# Patient Record
Sex: MALE | Race: BLACK OR AFRICAN AMERICAN | NOT HISPANIC OR LATINO | Employment: OTHER | URBAN - METROPOLITAN AREA
[De-identification: names, ages, dates, MRNs, and addresses within clinical notes are randomized per-mention and may not be internally consistent; named-entity substitution may affect disease eponyms.]

---

## 2017-01-25 ENCOUNTER — HOSPITAL ENCOUNTER (OUTPATIENT)
Dept: RADIOLOGY | Facility: OTHER | Age: 67
Discharge: HOME | End: 2017-01-25
Attending: OTOLARYNGOLOGY
Payer: MEDICARE

## 2017-01-25 DIAGNOSIS — J32.4 CHRONIC PANSINUSITIS: ICD-10-CM

## 2017-01-25 PROCEDURE — 70486 CT MAXILLOFACIAL W/O DYE: CPT | Mod: TC

## 2017-01-25 PROCEDURE — 70486 CT MAXILLOFACIAL W/O DYE: CPT | Mod: 26,,, | Performed by: RADIOLOGY

## 2017-05-06 ENCOUNTER — HOSPITAL ENCOUNTER (INPATIENT)
Facility: HOSPITAL | Age: 67
LOS: 1 days | Discharge: LEFT BEFORE DISCHARGE | DRG: 435 | End: 2017-05-07
Attending: EMERGENCY MEDICINE | Admitting: HOSPITALIST
Payer: MEDICARE

## 2017-05-06 DIAGNOSIS — K85.90 PANCREATITIS, ACUTE: ICD-10-CM

## 2017-05-06 DIAGNOSIS — R63.4 ABNORMAL WEIGHT LOSS: ICD-10-CM

## 2017-05-06 DIAGNOSIS — R10.84 GENERALIZED ABDOMINAL PAIN: Primary | ICD-10-CM

## 2017-05-06 DIAGNOSIS — M54.6 ACUTE BILATERAL THORACIC BACK PAIN: ICD-10-CM

## 2017-05-06 PROBLEM — R41.0 CONFUSION AND DISORIENTATION: Status: ACTIVE | Noted: 2017-05-06

## 2017-05-06 PROBLEM — E44.0 MODERATE PROTEIN-CALORIE MALNUTRITION: Status: ACTIVE | Noted: 2017-05-06

## 2017-05-06 PROBLEM — I81 PORTAL VEIN THROMBOSIS: Status: ACTIVE | Noted: 2017-05-06

## 2017-05-06 PROBLEM — R63.0 LACK OF APPETITE: Status: ACTIVE | Noted: 2017-05-06

## 2017-05-06 PROBLEM — R19.5 OCCULT GI BLEEDING: Status: ACTIVE | Noted: 2017-05-06

## 2017-05-06 PROBLEM — G89.29 CHRONIC LOW BACK PAIN: Status: ACTIVE | Noted: 2017-05-06

## 2017-05-06 PROBLEM — R10.30 LOWER ABDOMINAL PAIN: Status: ACTIVE | Noted: 2017-05-06

## 2017-05-06 PROBLEM — Z72.0 TOBACCO ABUSE: Status: ACTIVE | Noted: 2017-05-06

## 2017-05-06 PROBLEM — E83.52 HYPERCALCEMIA: Status: ACTIVE | Noted: 2017-05-06

## 2017-05-06 PROBLEM — I10 ESSENTIAL HYPERTENSION: Status: ACTIVE | Noted: 2017-05-06

## 2017-05-06 PROBLEM — M54.50 CHRONIC LOW BACK PAIN: Status: ACTIVE | Noted: 2017-05-06

## 2017-05-06 PROBLEM — K76.9 LIVER LESION, RIGHT LOBE: Status: ACTIVE | Noted: 2017-05-06

## 2017-05-06 PROBLEM — R91.8 MULTIPLE LUNG NODULES ON CT: Status: ACTIVE | Noted: 2017-05-06

## 2017-05-06 PROBLEM — D64.9 ANEMIA: Status: ACTIVE | Noted: 2017-05-06

## 2017-05-06 LAB
ALBUMIN SERPL BCP-MCNC: 2.9 G/DL
ALP SERPL-CCNC: 449 U/L
ALT SERPL W/O P-5'-P-CCNC: 59 U/L
ANION GAP SERPL CALC-SCNC: 9 MMOL/L
AST SERPL-CCNC: 333 U/L
BASOPHILS # BLD AUTO: 0.02 K/UL
BASOPHILS NFR BLD: 0.3 %
BILIRUB SERPL-MCNC: 1.3 MG/DL
BUN SERPL-MCNC: 37 MG/DL
CALCIUM SERPL-MCNC: 11.6 MG/DL
CHLORIDE SERPL-SCNC: 111 MMOL/L
CO2 SERPL-SCNC: 20 MMOL/L
CREAT SERPL-MCNC: 1.1 MG/DL
DIFFERENTIAL METHOD: ABNORMAL
EOSINOPHIL # BLD AUTO: 0.2 K/UL
EOSINOPHIL NFR BLD: 2.8 %
ERYTHROCYTE [DISTWIDTH] IN BLOOD BY AUTOMATED COUNT: 15.8 %
EST. GFR  (AFRICAN AMERICAN): >60 ML/MIN/1.73 M^2
EST. GFR  (NON AFRICAN AMERICAN): >60 ML/MIN/1.73 M^2
GLUCOSE SERPL-MCNC: 163 MG/DL
HCT VFR BLD AUTO: 27.3 %
HGB BLD-MCNC: 9 G/DL
LIPASE SERPL-CCNC: 435 U/L
LYMPHOCYTES # BLD AUTO: 2.4 K/UL
LYMPHOCYTES NFR BLD: 30.2 %
MCH RBC QN AUTO: 27 PG
MCHC RBC AUTO-ENTMCNC: 33 %
MCV RBC AUTO: 82 FL
MONOCYTES # BLD AUTO: 1.3 K/UL
MONOCYTES NFR BLD: 16.4 %
NEUTROPHILS # BLD AUTO: 4 K/UL
NEUTROPHILS NFR BLD: 50.2 %
PLATELET # BLD AUTO: 308 K/UL
PMV BLD AUTO: 10.3 FL
POTASSIUM SERPL-SCNC: 3.6 MMOL/L
PROT SERPL-MCNC: 8.1 G/DL
RBC # BLD AUTO: 3.33 M/UL
SODIUM SERPL-SCNC: 140 MMOL/L
WBC # BLD AUTO: 7.97 K/UL

## 2017-05-06 PROCEDURE — 83690 ASSAY OF LIPASE: CPT

## 2017-05-06 PROCEDURE — 25500020 PHARM REV CODE 255: Performed by: HOSPITALIST

## 2017-05-06 PROCEDURE — 80053 COMPREHEN METABOLIC PANEL: CPT

## 2017-05-06 PROCEDURE — 99284 EMERGENCY DEPT VISIT MOD MDM: CPT

## 2017-05-06 PROCEDURE — 25000003 PHARM REV CODE 250: Performed by: EMERGENCY MEDICINE

## 2017-05-06 PROCEDURE — 63600175 PHARM REV CODE 636 W HCPCS: Performed by: HOSPITALIST

## 2017-05-06 PROCEDURE — 99223 1ST HOSP IP/OBS HIGH 75: CPT | Mod: ,,, | Performed by: HOSPITALIST

## 2017-05-06 PROCEDURE — 63600175 PHARM REV CODE 636 W HCPCS: Performed by: EMERGENCY MEDICINE

## 2017-05-06 PROCEDURE — 96376 TX/PRO/DX INJ SAME DRUG ADON: CPT

## 2017-05-06 PROCEDURE — 96361 HYDRATE IV INFUSION ADD-ON: CPT

## 2017-05-06 PROCEDURE — 93010 ELECTROCARDIOGRAM REPORT: CPT | Mod: ,,, | Performed by: INTERNAL MEDICINE

## 2017-05-06 PROCEDURE — 25000003 PHARM REV CODE 250: Performed by: HOSPITALIST

## 2017-05-06 PROCEDURE — 99285 EMERGENCY DEPT VISIT HI MDM: CPT | Mod: ,,, | Performed by: EMERGENCY MEDICINE

## 2017-05-06 PROCEDURE — 63600175 PHARM REV CODE 636 W HCPCS: Performed by: STUDENT IN AN ORGANIZED HEALTH CARE EDUCATION/TRAINING PROGRAM

## 2017-05-06 PROCEDURE — 96374 THER/PROPH/DIAG INJ IV PUSH: CPT

## 2017-05-06 PROCEDURE — 93005 ELECTROCARDIOGRAM TRACING: CPT

## 2017-05-06 PROCEDURE — 11000001 HC ACUTE MED/SURG PRIVATE ROOM

## 2017-05-06 PROCEDURE — 85025 COMPLETE CBC W/AUTO DIFF WBC: CPT

## 2017-05-06 RX ORDER — SODIUM CHLORIDE 9 MG/ML
INJECTION, SOLUTION INTRAVENOUS CONTINUOUS
Status: DISCONTINUED | OUTPATIENT
Start: 2017-05-06 | End: 2017-05-07 | Stop reason: HOSPADM

## 2017-05-06 RX ORDER — OXYCODONE HYDROCHLORIDE 5 MG/1
5 TABLET ORAL EVERY 4 HOURS PRN
Status: DISCONTINUED | OUTPATIENT
Start: 2017-05-06 | End: 2017-05-07 | Stop reason: HOSPADM

## 2017-05-06 RX ORDER — SODIUM CHLORIDE 9 MG/ML
INJECTION, SOLUTION INTRAVENOUS ONCE
Status: COMPLETED | OUTPATIENT
Start: 2017-05-06 | End: 2017-05-06

## 2017-05-06 RX ORDER — ONDANSETRON 2 MG/ML
4 INJECTION INTRAMUSCULAR; INTRAVENOUS EVERY 6 HOURS PRN
Status: DISCONTINUED | OUTPATIENT
Start: 2017-05-06 | End: 2017-05-07 | Stop reason: HOSPADM

## 2017-05-06 RX ORDER — GLUCAGON 1 MG
1 KIT INJECTION
Status: DISCONTINUED | OUTPATIENT
Start: 2017-05-06 | End: 2017-05-07 | Stop reason: HOSPADM

## 2017-05-06 RX ORDER — MORPHINE SULFATE 4 MG/ML
4 INJECTION, SOLUTION INTRAMUSCULAR; INTRAVENOUS EVERY 4 HOURS PRN
Status: DISCONTINUED | OUTPATIENT
Start: 2017-05-06 | End: 2017-05-07 | Stop reason: HOSPADM

## 2017-05-06 RX ORDER — LISINOPRIL 20 MG/1
20 TABLET ORAL DAILY
Status: DISCONTINUED | OUTPATIENT
Start: 2017-05-07 | End: 2017-05-07 | Stop reason: HOSPADM

## 2017-05-06 RX ORDER — IPRATROPIUM BROMIDE AND ALBUTEROL SULFATE 2.5; .5 MG/3ML; MG/3ML
3 SOLUTION RESPIRATORY (INHALATION) EVERY 4 HOURS PRN
Status: DISCONTINUED | OUTPATIENT
Start: 2017-05-06 | End: 2017-05-07 | Stop reason: HOSPADM

## 2017-05-06 RX ORDER — MORPHINE SULFATE 2 MG/ML
4 INJECTION, SOLUTION INTRAMUSCULAR; INTRAVENOUS
Status: COMPLETED | OUTPATIENT
Start: 2017-05-06 | End: 2017-05-06

## 2017-05-06 RX ORDER — MEGESTROL ACETATE 40 MG/ML
200 SUSPENSION ORAL DAILY
Status: DISCONTINUED | OUTPATIENT
Start: 2017-05-07 | End: 2017-05-07 | Stop reason: HOSPADM

## 2017-05-06 RX ORDER — IBUPROFEN 200 MG
24 TABLET ORAL
Status: DISCONTINUED | OUTPATIENT
Start: 2017-05-06 | End: 2017-05-07 | Stop reason: HOSPADM

## 2017-05-06 RX ORDER — ACETAMINOPHEN 325 MG/1
325 TABLET ORAL EVERY 6 HOURS PRN
Status: DISCONTINUED | OUTPATIENT
Start: 2017-05-06 | End: 2017-05-07 | Stop reason: HOSPADM

## 2017-05-06 RX ORDER — IBUPROFEN 200 MG
16 TABLET ORAL
Status: DISCONTINUED | OUTPATIENT
Start: 2017-05-06 | End: 2017-05-07 | Stop reason: HOSPADM

## 2017-05-06 RX ORDER — IBUPROFEN 200 MG
1 TABLET ORAL
Status: DISCONTINUED | OUTPATIENT
Start: 2017-05-06 | End: 2017-05-07 | Stop reason: HOSPADM

## 2017-05-06 RX ORDER — OXYCODONE AND ACETAMINOPHEN 10; 325 MG/1; MG/1
1 TABLET ORAL EVERY 6 HOURS PRN
COMMUNITY

## 2017-05-06 RX ORDER — LISINOPRIL 20 MG/1
20 TABLET ORAL DAILY
COMMUNITY

## 2017-05-06 RX ORDER — RAMELTEON 8 MG/1
8 TABLET ORAL NIGHTLY PRN
Status: DISCONTINUED | OUTPATIENT
Start: 2017-05-06 | End: 2017-05-07 | Stop reason: HOSPADM

## 2017-05-06 RX ORDER — LACTULOSE 10 G/15ML
10 SOLUTION ORAL EVERY 6 HOURS PRN
Status: DISCONTINUED | OUTPATIENT
Start: 2017-05-06 | End: 2017-05-07

## 2017-05-06 RX ORDER — POLYETHYLENE GLYCOL 3350 17 G/17G
17 POWDER, FOR SOLUTION ORAL DAILY
Status: DISCONTINUED | OUTPATIENT
Start: 2017-05-07 | End: 2017-05-07

## 2017-05-06 RX ORDER — ENOXAPARIN SODIUM 100 MG/ML
40 INJECTION SUBCUTANEOUS EVERY 24 HOURS
Status: DISCONTINUED | OUTPATIENT
Start: 2017-05-06 | End: 2017-05-07 | Stop reason: HOSPADM

## 2017-05-06 RX ADMIN — NICOTINE 1 PATCH: 21 PATCH, EXTENDED RELEASE TOPICAL at 05:05

## 2017-05-06 RX ADMIN — SODIUM CHLORIDE: 0.9 INJECTION, SOLUTION INTRAVENOUS at 10:05

## 2017-05-06 RX ADMIN — ENOXAPARIN SODIUM 40 MG: 100 INJECTION SUBCUTANEOUS at 10:05

## 2017-05-06 RX ADMIN — MORPHINE SULFATE 4 MG: 2 INJECTION, SOLUTION INTRAMUSCULAR; INTRAVENOUS at 08:05

## 2017-05-06 RX ADMIN — IOHEXOL 75 ML: 350 INJECTION, SOLUTION INTRAVENOUS at 08:05

## 2017-05-06 RX ADMIN — SODIUM CHLORIDE 1000 ML: 0.9 INJECTION, SOLUTION INTRAVENOUS at 04:05

## 2017-05-06 RX ADMIN — MORPHINE SULFATE 4 MG: 2 INJECTION, SOLUTION INTRAMUSCULAR; INTRAVENOUS at 05:05

## 2017-05-06 NOTE — ED TRIAGE NOTES
Daughter brings pt to ER for 1 week of not eating/drinking, abdominal pain bilateral lower quadrant and middle lower back pain bilateral.  Subjectively, pt feels weak, miserable.  Pt has generalized weakness, recent fall around a week ago.  30lbs lost in 3 months (unintentionally). Daughter states around [place] pt was already thinner than normal but she has never seen him this thin.

## 2017-05-06 NOTE — ED PROVIDER NOTES
Encounter Date: 5/6/2017    SCRIBE #1 NOTE: I, Darshana Owen, am scribing for, and in the presence of,  Dr. Calzada. I have scribed the following portions of the note - the Resident attestation.       History     Chief Complaint   Patient presents with    Abdominal Pain     loss of appetite/ family states lethargic, not eating, recently diagnosed with Hep C    Hepatitis C     67-year-old man with recent diagnosis of hepatitis C and hypertension presents with 1 week of new diffuse abdominal pain and spinal and paraspinal mid back pain, dull in nature, severe in intensity, constant, associated with anorexia and constipation.  Denies nausea, vomiting, diarrhea, dysuria, hematuria, saddle anesthesia, and focal neuro deficits.  Endorses 30 pound weight loss in past 3 months. Endorses tobacco abuse, denies ETOH and illicits.    Review of patient's allergies indicates:  No Known Allergies  The history is provided by the patient and medical records.     Past Medical History:   Diagnosis Date    Arthritis     Diabetes mellitus     Enlarged prostate     Hypertension      Past Surgical History:   Procedure Laterality Date    BUNIONECTOMY      CHOLECYSTECTOMY      EYE SURGERY       No family history on file.  Social History   Substance Use Topics    Smoking status: Former Smoker     Quit date: 11/18/2015    Smokeless tobacco: Not on file    Alcohol use No     Review of Systems   Constitutional: Positive for appetite change, fatigue and unexpected weight change. Negative for fever.   HENT: Positive for congestion. Negative for facial swelling.    Eyes: Negative for redness.   Respiratory: Negative for cough and shortness of breath.    Cardiovascular: Negative for chest pain.   Gastrointestinal: Positive for abdominal pain and constipation. Negative for diarrhea, nausea and vomiting.   Genitourinary: Negative for difficulty urinating, dysuria and hematuria.   Musculoskeletal: Positive for back pain. Negative for joint  swelling.   Skin: Negative for wound.   Neurological: Positive for weakness. Negative for facial asymmetry and numbness.   Psychiatric/Behavioral: Negative for agitation.       Physical Exam   Initial Vitals   BP Pulse Resp Temp SpO2   05/06/17 1547 05/06/17 1547 05/06/17 1547 05/06/17 1547 05/06/17 1547   138/75 109 16 98.9 °F (37.2 °C) 98 %     Physical Exam    Nursing note and vitals reviewed.  Constitutional: He appears well-developed and well-nourished. He is not diaphoretic. No distress.   HENT:   Head: Normocephalic and atraumatic.   Right Ear: External ear normal.   Left Ear: External ear normal.   Nose: Nose normal.   Mouth/Throat: Oropharynx is clear and moist.   Eyes: Conjunctivae are normal. Pupils are equal, round, and reactive to light. Right eye exhibits no discharge. Left eye exhibits no discharge. No scleral icterus.   Neck: Neck supple. No thyromegaly present. No tracheal deviation present. No JVD present.   Cardiovascular: Regular rhythm, normal heart sounds and intact distal pulses. Tachycardia present.  Exam reveals no gallop and no friction rub.    No murmur heard.  Pulmonary/Chest: Breath sounds normal. No stridor. No respiratory distress. He has no wheezes. He has no rhonchi. He has no rales.   Abdominal: Soft. Normal appearance and bowel sounds are normal. He exhibits mass. He exhibits no distension and no ascites. There is tenderness in the epigastric area. There is no rigidity, no rebound, no guarding and no CVA tenderness.   Musculoskeletal: He exhibits no edema.        Thoracic back: He exhibits no tenderness and no bony tenderness.        Lumbar back: He exhibits no tenderness and no bony tenderness.   Lymphadenopathy:     He has no cervical adenopathy.   Neurological: He is alert and oriented to person, place, and time. He has normal strength. No sensory deficit.   No saddle anesthesia.  Lower extremity strength 5 out of 5 bilaterally.   Skin: Skin is warm and dry.   Psychiatric: He  has a normal mood and affect. His behavior is normal.         ED Course   Procedures  Labs Reviewed   CBC W/ AUTO DIFFERENTIAL - Abnormal; Notable for the following:        Result Value    RBC 3.33 (*)     Hemoglobin 9.0 (*)     Hematocrit 27.3 (*)     RDW 15.8 (*)     Mono # 1.3 (*)     Mono% 16.4 (*)     All other components within normal limits   COMPREHENSIVE METABOLIC PANEL - Abnormal; Notable for the following:     Chloride 111 (*)     CO2 20 (*)     Glucose 163 (*)     BUN, Bld 37 (*)     Calcium 11.6 (*)     Albumin 2.9 (*)     Total Bilirubin 1.3 (*)     Alkaline Phosphatase 449 (*)      (*)     ALT 59 (*)     All other components within normal limits   LIPASE - Abnormal; Notable for the following:     Lipase 435 (*)     All other components within normal limits          HOII MDM:  Ac Andrade is a 67 y.o. male with hepatitis C who presents with abdominal pain, weight loss, anorexia.  Exam with abdominal mass and tenderness in epigastric region over mass.  Ddx includes malignancy, pancreatitis, hepatitis, cholecystitis, gastroenteritis, abdominal aortic aneurysm, diverticulitis.    CBC, CMP, lipase, CT abdomen and pelvis with contrast pending.  1 L IV fluids and 4 mg morphine IV.    Lc Bhatt, PGY2 4:41 PM 05/06/2017     CBC with new anemia of hemoglobin 9.0 and hematocrit 27.3, MCV normal 82.  Upon questioning, patient admits to black tarry stools.  Performed bedside Hemoccult which was positive.    CMP with BUN 37, normal renal function, calcium 11.6, alkaline phosphatase 449, albumin 2.9, total bilirubin 1.3, , ALT 59, lipase 435.    Patient pending CT abdomen and pelvis with contrast.  Signed out to resident Dr. Hong.    Lc Bhatt, PGY2 6:57 PM 05/06/2017           Medical Decision Making:   History:   Old Medical Records: I decided to obtain old medical records.  Clinical Tests:   Lab Tests: Reviewed and Ordered  Radiological Study: Reviewed and Ordered             Scribe Attestation:   Scribe #1: I performed the above scribed service and the documentation accurately describes the services I performed. I attest to the accuracy of the note.    Attending Attestation:   Physician Attestation Statement for Resident:  As the supervising MD   Physician Attestation Statement: I have personally seen and examined this patient.   I agree with the above history. -: This is an emergent evaluation of a 67 y.o. male presenting with  Worsening abdominal pain and general dysfunction. Pt is very ill appearing, cachectic. Unknown abdominal cancer diagnosis. Will get labs, CT imaging. Will admit the pt to the hospital.   As the supervising MD I agree with the above PE.    As the supervising MD I agree with the above treatment, course, plan, and disposition.  I have reviewed and agree with the residents interpretation of the following: lab data and CT scans.  I have reviewed the following: old records at this facility.          Physician Attestation for Scribe:  Physician Attestation Statement for Scribe #1: I, Dr. Calzada, reviewed documentation, as scribed by Darshana Owen in my presence, and it is both accurate and complete.                 ED Course     Clinical Impression:   The primary encounter diagnosis was Generalized abdominal pain. Diagnoses of Acute bilateral thoracic back pain, Abnormal weight loss, and Pancreatitis, acute were also pertinent to this visit.    Disposition:   Disposition: Admitted       Juhi Hong MD  Resident  05/06/17 [id number]

## 2017-05-07 ENCOUNTER — TELEPHONE (OUTPATIENT)
Dept: HEPATOLOGY | Facility: HOSPITAL | Age: 67
End: 2017-05-07

## 2017-05-07 VITALS
TEMPERATURE: 97 F | HEIGHT: 67 IN | HEART RATE: 66 BPM | SYSTOLIC BLOOD PRESSURE: 145 MMHG | DIASTOLIC BLOOD PRESSURE: 70 MMHG | BODY MASS INDEX: 17.58 KG/M2 | OXYGEN SATURATION: 100 % | WEIGHT: 112 LBS | RESPIRATION RATE: 18 BRPM

## 2017-05-07 LAB
25(OH)D3+25(OH)D2 SERPL-MCNC: 17 NG/ML
ABO + RH BLD: NORMAL
AFP SERPL-MCNC: 103 NG/ML
ALBUMIN SERPL BCP-MCNC: 3 G/DL
ALP SERPL-CCNC: 476 U/L
ALT SERPL W/O P-5'-P-CCNC: 61 U/L
AMMONIA PLAS-SCNC: 72 UMOL/L
ANION GAP SERPL CALC-SCNC: 9 MMOL/L
AST SERPL-CCNC: 325 U/L
BASOPHILS # BLD AUTO: 0.02 K/UL
BASOPHILS NFR BLD: 0.3 %
BILIRUB SERPL-MCNC: 1.2 MG/DL
BLD GP AB SCN CELLS X3 SERPL QL: NORMAL
BUN SERPL-MCNC: 31 MG/DL
CA-I BLDV-SCNC: 1.51 MMOL/L
CALCIUM SERPL-MCNC: 11.7 MG/DL
CANCER AG19-9 SERPL-ACNC: <2 U/ML
CEA SERPL-MCNC: 7.9 NG/ML
CHLORIDE SERPL-SCNC: 110 MMOL/L
CO2 SERPL-SCNC: 21 MMOL/L
CREAT SERPL-MCNC: 1 MG/DL
DIFFERENTIAL METHOD: ABNORMAL
EOSINOPHIL # BLD AUTO: 0.2 K/UL
EOSINOPHIL NFR BLD: 3.7 %
ERYTHROCYTE [DISTWIDTH] IN BLOOD BY AUTOMATED COUNT: 16.1 %
EST. GFR  (AFRICAN AMERICAN): >60 ML/MIN/1.73 M^2
EST. GFR  (NON AFRICAN AMERICAN): >60 ML/MIN/1.73 M^2
FERRITIN SERPL-MCNC: 180 NG/ML
FOLATE SERPL-MCNC: 3.7 NG/ML
GGT SERPL-CCNC: 1463 U/L
GLUCOSE SERPL-MCNC: 188 MG/DL
HAPTOGLOB SERPL-MCNC: 109 MG/DL
HCT VFR BLD AUTO: 25.9 %
HGB BLD-MCNC: 8.9 G/DL
INR PPP: 1.6
IRON SERPL-MCNC: 71 UG/DL
LIPASE SERPL-CCNC: 510 U/L
LYMPHOCYTES # BLD AUTO: 1.8 K/UL
LYMPHOCYTES NFR BLD: 30.9 %
MAGNESIUM SERPL-MCNC: 1.8 MG/DL
MCH RBC QN AUTO: 27.6 PG
MCHC RBC AUTO-ENTMCNC: 34.4 %
MCV RBC AUTO: 80 FL
MONOCYTES # BLD AUTO: 0.7 K/UL
MONOCYTES NFR BLD: 12.2 %
NEUTROPHILS # BLD AUTO: 3.1 K/UL
NEUTROPHILS NFR BLD: 52.7 %
PHOSPHATE SERPL-MCNC: 1.5 MG/DL
PLATELET # BLD AUTO: 294 K/UL
PMV BLD AUTO: 10.3 FL
POTASSIUM SERPL-SCNC: 3.6 MMOL/L
PREALB SERPL-MCNC: 11 MG/DL
PROT SERPL-MCNC: 8.1 G/DL
PROTHROMBIN TIME: 16.3 SEC
PTH-INTACT SERPL-MCNC: <5 PG/ML
RBC # BLD AUTO: 3.23 M/UL
RETICS/RBC NFR AUTO: 1.8 %
SATURATED IRON: 22 %
SODIUM SERPL-SCNC: 140 MMOL/L
TOTAL IRON BINDING CAPACITY: 330 UG/DL
TRANSFERRIN SERPL-MCNC: 223 MG/DL
TSH SERPL DL<=0.005 MIU/L-ACNC: 1.72 UIU/ML
VIT B12 SERPL-MCNC: 1367 PG/ML
WBC # BLD AUTO: 5.92 K/UL

## 2017-05-07 PROCEDURE — 84100 ASSAY OF PHOSPHORUS: CPT

## 2017-05-07 PROCEDURE — 82607 VITAMIN B-12: CPT

## 2017-05-07 PROCEDURE — 36415 COLL VENOUS BLD VENIPUNCTURE: CPT

## 2017-05-07 PROCEDURE — 82140 ASSAY OF AMMONIA: CPT

## 2017-05-07 PROCEDURE — 82330 ASSAY OF CALCIUM: CPT

## 2017-05-07 PROCEDURE — 85025 COMPLETE CBC W/AUTO DIFF WBC: CPT

## 2017-05-07 PROCEDURE — 82977 ASSAY OF GGT: CPT

## 2017-05-07 PROCEDURE — 84134 ASSAY OF PREALBUMIN: CPT

## 2017-05-07 PROCEDURE — 84443 ASSAY THYROID STIM HORMONE: CPT

## 2017-05-07 PROCEDURE — 85610 PROTHROMBIN TIME: CPT

## 2017-05-07 PROCEDURE — 86592 SYPHILIS TEST NON-TREP QUAL: CPT

## 2017-05-07 PROCEDURE — 85045 AUTOMATED RETICULOCYTE COUNT: CPT

## 2017-05-07 PROCEDURE — 80074 ACUTE HEPATITIS PANEL: CPT

## 2017-05-07 PROCEDURE — 83540 ASSAY OF IRON: CPT

## 2017-05-07 PROCEDURE — 80053 COMPREHEN METABOLIC PANEL: CPT

## 2017-05-07 PROCEDURE — 25000003 PHARM REV CODE 250: Performed by: HOSPITALIST

## 2017-05-07 PROCEDURE — 83010 ASSAY OF HAPTOGLOBIN QUANT: CPT

## 2017-05-07 PROCEDURE — 82397 CHEMILUMINESCENT ASSAY: CPT

## 2017-05-07 PROCEDURE — 86301 IMMUNOASSAY TUMOR CA 19-9: CPT

## 2017-05-07 PROCEDURE — 99233 SBSQ HOSP IP/OBS HIGH 50: CPT | Mod: ,,, | Performed by: INTERNAL MEDICINE

## 2017-05-07 PROCEDURE — 82306 VITAMIN D 25 HYDROXY: CPT

## 2017-05-07 PROCEDURE — 99223 1ST HOSP IP/OBS HIGH 75: CPT | Mod: GC,,, | Performed by: INTERNAL MEDICINE

## 2017-05-07 PROCEDURE — 86850 RBC ANTIBODY SCREEN: CPT

## 2017-05-07 PROCEDURE — 25000003 PHARM REV CODE 250: Performed by: INTERNAL MEDICINE

## 2017-05-07 PROCEDURE — 86900 BLOOD TYPING SEROLOGIC ABO: CPT

## 2017-05-07 PROCEDURE — 86703 HIV-1/HIV-2 1 RESULT ANTBDY: CPT

## 2017-05-07 PROCEDURE — 82105 ALPHA-FETOPROTEIN SERUM: CPT

## 2017-05-07 PROCEDURE — 83735 ASSAY OF MAGNESIUM: CPT

## 2017-05-07 PROCEDURE — 82728 ASSAY OF FERRITIN: CPT

## 2017-05-07 PROCEDURE — 82746 ASSAY OF FOLIC ACID SERUM: CPT

## 2017-05-07 PROCEDURE — 87522 HEPATITIS C REVRS TRNSCRPJ: CPT

## 2017-05-07 PROCEDURE — 63600175 PHARM REV CODE 636 W HCPCS: Performed by: HOSPITALIST

## 2017-05-07 PROCEDURE — 83970 ASSAY OF PARATHORMONE: CPT

## 2017-05-07 PROCEDURE — 83690 ASSAY OF LIPASE: CPT

## 2017-05-07 PROCEDURE — 82378 CARCINOEMBRYONIC ANTIGEN: CPT

## 2017-05-07 RX ORDER — LACTULOSE 10 G/15ML
15 SOLUTION ORAL 3 TIMES DAILY
Status: DISCONTINUED | OUTPATIENT
Start: 2017-05-07 | End: 2017-05-07 | Stop reason: HOSPADM

## 2017-05-07 RX ORDER — IBUPROFEN 200 MG
1 TABLET ORAL
Status: DISCONTINUED | OUTPATIENT
Start: 2017-05-07 | End: 2017-05-07

## 2017-05-07 RX ORDER — FOLIC ACID 1 MG/1
1 TABLET ORAL DAILY
Status: DISCONTINUED | OUTPATIENT
Start: 2017-05-07 | End: 2017-05-07 | Stop reason: HOSPADM

## 2017-05-07 RX ADMIN — SODIUM CHLORIDE: 0.9 INJECTION, SOLUTION INTRAVENOUS at 12:05

## 2017-05-07 RX ADMIN — MORPHINE SULFATE 4 MG: 4 INJECTION INTRAVENOUS at 08:05

## 2017-05-07 RX ADMIN — OXYCODONE HYDROCHLORIDE 5 MG: 5 TABLET ORAL at 04:05

## 2017-05-07 NOTE — CONSULTS
Hepatology  Consult note      SUBJECTIVE:     Reason for Consult: Liver Mass    History of Present Illness:  67-year-old man with h/o HTN, active smoker (1 PPD X 50 years ), chronic HCV (diagnosed in his 40s), presented to ED yesterday accompanied by his two daughters with lower abdominal pain radiating to his back for one week.     The patient reports he has been having this pain intermittently for last six months but became constant and more severe over last one week. Also notes loss of appetite and 30 lb weight loss over last six months.  The patient also reports fatigue, generalized weakness, daughter noticed patient having intermittent confusion with disorientation.     Patient denies N/V/D, BRBPR, melena,cough, SOB. Daughter mentioned that patient was diagnosed with HCV about 30 years ago without any issue and was told he got it likely from tattoos.    A single phase CT showed a 3.7cm hypoattenuating lesion in the right lobe with possible adjacent local metastases.  AFP is 103    PTA Medications   Medication Sig    lisinopril (PRINIVIL,ZESTRIL) 20 MG tablet Take 20 mg by mouth once daily.    oxycodone-acetaminophen (PERCOCET)  mg per tablet Take 1 tablet by mouth every 6 (six) hours as needed for Pain.    cetirizine (ZYRTEC) 10 MG tablet Take 1 tablet (10 mg total) by mouth once daily.       Review of patient's allergies indicates:  No Known Allergies     Past Medical History:   Diagnosis Date    Arthritis     Diabetes mellitus     Enlarged prostate     Hypertension      Past Surgical History:   Procedure Laterality Date    BUNIONECTOMY      CHOLECYSTECTOMY      EYE SURGERY       History reviewed. No pertinent family history.  Social History   Substance Use Topics    Smoking status: Former Smoker     Quit date: 11/18/2015    Smokeless tobacco: None    Alcohol use No       Review of Systems:  Constitutional: per HPI  Eyes: no visual changes   ENT: no sore throat or dysphagia  Respiratory: no  cough or shortness of breath   Cardiovascular: no chest pain or palpitations   Gastrointestinal: as per HPI  Hematologic/Lymphatic: no easy bruising or lymphadenopathy   Musculoskeletal: no arthralgias or myalgias   Neurological: no seizures, tremors or change in mental status  Behavioral/Psych: no auditory or visual hallucinations    OBJECTIVE:     Vital Signs (Most Recent)  Temp: 97.4 °F (36.3 °C) (05/07/17 0800)  Pulse: 66 (05/07/17 0800)  Resp: 18 (05/07/17 0800)  BP: (!) 145/70 (05/07/17 0800)  SpO2: 100 % (05/07/17 0800)    Physical Exam:  General appearance: cachectic, chronically ill appearing, no acute distress  Eyes: anicteric sclerae  Throat: lips, mucosa, and tongue normal, Mallampati II  Lungs: breath sounds vesicular in nature, air entry equal bilaterally,   Heart: regular rate and rhythm, S1, S2 clearly audible,   Abdomen: soft, non-tender, non-distended; no rebound tenderness, rigidity, or guarding  Extremities: bilateral clubbing present  Pulses: 2+ and symmetric  Skin: Skin color, texture, turgor normal.   Neurologic: No focal deficits noted      Laboratory    CBC:   Recent Labs  Lab 05/06/17 1740 05/07/17  0422   WBC 7.97 5.92   RBC 3.33* 3.23*   HGB 9.0* 8.9*   HCT 27.3* 25.9*    294   MCV 82 80*   MCH 27.0 27.6   MCHC 33.0 34.4     BMP:   Recent Labs  Lab 05/06/17 1740 05/07/17  0422   * 188*    140   K 3.6 3.6   * 110   CO2 20* 21*   BUN 37* 31*   CREATININE 1.1 1.0   CALCIUM 11.6* 11.7*   MG  --  1.8     Coagulation:   Recent Labs  Lab 05/07/17  0423   INR 1.6*           ASSESSMENT/PLAN:     HCV, Liver mass  - Atypical appearance on single phase CT for HCC, however combination of chronic HCV and AFP of 101 raises possibility of atypical HCC      Recommendations:   Will present to IR conference on Tuesday for further evaluation.  May ultimately need biopsy of mass.  Can obtain CT Triple Phase in the interim per Heme/Onc recs  Check HCV Genotype/RNA      Chavez You    Gastroenterology Fellow PGY V  [phone]

## 2017-05-07 NOTE — [facility] SECONDARY REVIEW
Physician Advisor External    Level of Care Issue    Approved Inpatient- 05/07/2017 @ 0620 - per Dr. Ijeoma Crockett, EHR. Approves Inpatient

## 2017-05-07 NOTE — CONSULTS
Consulted to perform emergent capacity evaluation on Mr. Andrade. Dr. Gil and I moved quickly but arrived after the patient left. In speaking with the treatment team, multiple individuals expressed concern about the patient but he was exhibiting no signs of psychosis and at least superficially seemed to understand his medical situation, and thus there was no clear criteria for a PEC. As a result, the patient was allowed to leave when he expressed a desire to do so.    Ruiz Woods IV, MD  PGY-2 Psychiatry, [facility]  05/07/2017 12:45 PM

## 2017-05-07 NOTE — DISCHARGE SUMMARY
DISCHARGE SUMMARY  Hospital Medicine    Team: [facility]    Patient Name: Ac Andrade  YOB: 1950    Admit Date: 5/6/2017    Discharge Date: 05/07/2017    Discharge Attending Physician: Julisa Pickens MD     Chief Complaint: abdominal pain     Princilpal Diagnoses:  Active Hospital Problems    Diagnosis  POA    *Pancreatitis, acute [K85.90]  Yes    Liver lesion, right lobe [K76.89]  Yes    Portal vein thrombosis [I81]  Yes    Hypercalcemia [E83.52]  Yes    Moderate protein-calorie malnutrition [E44.0]  Yes    Tobacco abuse [Z72.0]  Yes    Chronic low back pain [M54.5, G89.29]  Yes    Lower abdominal pain [R10.30]  Yes    Weight loss, unintentional [R63.4]  Yes    Lack of appetite [R63.0]  Yes    Confusion and disorientation [F99]  Yes    Multiple lung nodules on CT [R91.8]  Yes    Essential hypertension [I10]  Yes    Occult GI bleeding [R19.5]  Yes    Anemia [D64.9]  Yes      Resolved Hospital Problems    Diagnosis Date Resolved POA   No resolved problems to display.       Discharged Condition: Patient left A    HOSPITAL COURSE:      Initial Presentation:    67-year-old man with h/o HTN, chronic low back pian on percocet prn, active smoker ( 1 PPD X 50 years ), recent diagnosis of hepatitis C presents to ED accompanied by his two daughters with lower abdominal pain radiating to his back for one week. Reports he has been having this pain intermittently for last six months but became constant and more severe over last one week. Describes the pain as sharp in nature, worsen with food intake and not relieved with percocet 10/325mg that he takes prn for his chronic back pain. Also notes loss of appetite and 30 lb weight loss over last six months. He lives alone, able to perform ADLs and his two daughters who live in [place] keep close contact with their father. Daughters came in town yesterday to visit him and they were surprised about his rapid weight loss. They brought him to  [facility] [place] [place] ED for further evaluation.   Also reports fatigue, generalized weakness, daughter noticed patient having intermittent confusion with disorientation. Patient denies N/V/D, BRBPR, melena,cough, SOB, chest pain, hemoptysis, hematuria, or any other urinary symptoms.      Daughter mentioned that patient was diagnosed with HCV about 30 years ago without any issue and was told he got it likely from tattoos . He follows periodically at [facility] with Dr. Mcdermott / Dr. Elizalde ( [phone] ). Per daughters during recent visit at [facility] patient was told that his hepatitis C was getting worse and CT abdomen showed liver abnormality ( no reports available ). Patient had negative colonoscopy in 2014.      He was on lantus and metformin in past for DM, but was recently discontinued since he lost significant weight and blood glucose remained within acceptable range w/o medications.      Currently he only takes lisinopril 20 mg daily and percocet 10/325 prn back pain.      He smokes 1 PPD X 50 years, quits drinking beer about six months ago. Endorses smoking marijuana in remote past, but denies h/o IVDU.         Labs in ED significant for Hgb 9 down from 13 one year ago. Lipase 435, bilirubin 1.3, Alk -P 449, ALT 59, , calcium 11.6, albumin 2.9 with corrected calcium 12.4.      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      He received morphine 4 mg IVP X 2 for abdominal pain, zofran for nausea and NS 1000 CC bolus in ED.     Course of Principle Problem for Admission:    Hem-Onc and Hepatology were consulted for concern for metastatic hepatocellular carcinoma. The morning after admission, patient expressed that he wanted to leave the hospital. We had an extensive discussion (over 30 minutes) with Mr. Andrade. We were extremely clear that we do  not want him to leave the hospital. Patient was told that that he most likely has liver cancer with metastatic disease. He was told that this is a life threatening disease and that leaving delays his diagnosis and prevents timely treatment. We made it clear that he has cancer in his lungs and that further spread can involve his brain. He was also told that he has evidence that his liver is not working well and this will worsen and result in death. In addition, he has some evidence of obstruction of the biliary tcan ree which has caused pancreatic issues. He repeated very clearly that he understands that he has cancer and it is bad. He reiterated that he understands that we did not want him to leave and that delaying his treatment is life threatening. He understood that his family does not want him to leave. He understood that we will not be providing any transportation. He stated that he has made his peace and wants to go home. He was not having hallucinations, denied suicidal or homicidal ideation, and was neither manic nor psychotic. He was aware, oriented and capable of making decisions relevant to his care. Hem Onc was called and confirmed that the patient had expressed understanding that he has cancer and that it is life threatening. Daughter mentioned in hallway that patient has a history of schizophrenia so psychiatry to assess patient but patient refused to talk and left hospital. Patient signed AMA form prior to leaving the hospital. We let the patient know that he can return to the hospital at any time so that his care can be resumed.    Other Medical Problems Addressed in the Hospital:    # Hepatic Lesion   - CT abdomen showed 3.7 cm right hepatic lesion with tumor thrombus of right portal vain with partial occlusion   - Given h/o HCV, weight loss, lack of appetite the above finding highly suspicious for hepatocellular carcinoma   - No ascites on exam or CT   - Check CT head given intermittent confusion  and liver lesion to r/o mets   - Check CT chest to r/o mets ( micro nodules on abdominal CT )  - Check hepatitis panel, HCV PCR, AFP, CA 19-9, CEA, ammonia, GGT,  HIV, RPR   - Hepatology and oncology consult for further evaluation of suspected HCC   - pain control with prn morphine      # Anemia with Occult GI bleed   - Hgb dropped to 9 compared to 13 about one year ago   - patient denies adrien bleeding or melena   - Guaiac positive stool in ED   - No evidence of active bleeding   - Had negative colonoscopy in 2014 per daughters and patient   - Never had EGD   - Platelet wnl   - check PT/INR in am   - Check iron studies, FOBT, ferritin, vit B12, folate, retic count, haptoglobin for anemia   - If Hgb continues to trend downward then consider GI consult with EGD to evaluate for varices      # Pancreatitis   - Lipase elevated to 435 with pain across lower abdomen radiating to back   - CT negative for acute pancreatitis   - Supportive care with IVF, pain control prn      # Hypercalcemia   - Corrected Calcium 12.4   - Likely paraneoplastic etiology   - check EKG, PTH, PTHrp, ionized Ca, vitamin D level   - Hypercalcemia might be contributing to intermittent confusion, disorientation and abdominal pain   - Continue IVF and then consider lasix      # Loss of appetite with weight loss and protein calorie malnutrition   - Likely from malignancy ( HCC )  - Trial of megace and boost plus with meal   - check pre albumin level      # HTN   - Continue home dose lisinopril      # Chronic low back pain   - due to lumbar DJD   - Takes percocet 10/325 prn at home   - Oxycodone and morphine IVP prn while in hospital      # Tobacco abuse   - Counseled cessation   - nicotine patch      # DVT PPX  - Lovenox 40 mg daily     CONSULTS: Hematology-Oncology, Hepatology     Last CBC/BMP/HgbA1c (if applicable):  Recent Results (from the past 336 hour(s))   CBC with Automated Differential    Collection Time: 05/07/17  4:22 AM   Result Value Ref  Range    WBC 5.92 3.90 - 12.70 K/uL    Hemoglobin 8.9 (L) 14.0 - 18.0 g/dL    Hematocrit 25.9 (L) 40.0 - 54.0 %    Platelets 294 150 - 350 K/uL   CBC W/ AUTO DIFFERENTIAL    Collection Time: 05/06/17  5:40 PM   Result Value Ref Range    WBC 7.97 3.90 - 12.70 K/uL    Hemoglobin 9.0 (L) 14.0 - 18.0 g/dL    Hematocrit 27.3 (L) 40.0 - 54.0 %    Platelets 308 150 - 350 K/uL     No results found for this or any previous visit (from the past 336 hour(s)).  Lab Results   Component Value Date    HGBA1C 14.5 (H) 04/18/2016       Other Pertinent Lab Findings:        Pertinent/Significant Diagnostic Studies:    Imaging Results         CT Chest Without Contrast (Final result) Result time:  05/07/17 02:55:14    Final result by Kelly Darling MD (05/07/17 02:55:14)    Impression:      1.  Innumerable bilateral pulmonary micronodules/nodules, the largest of which measures 0.6 cm within the left lower lobe. Per Fleischner Society 2017 guidelines, follow-up chest CT is recommended at approximately 3-6 months, followed by repeat chest CT at 18-24 months.    2.  Centrilobular emphysematous change the pulmonary parenchyma and biapical scarring.        Electronically signed by: KELLY DARLING  Date:     05/07/17  Time:    02:55     Narrative:    TECHNIQUE: The chest was surveyed from the lung apices through the costophrenic angles without the use of intravenous contrast material.  Data was reformatted for contiguous 5 mm images in the axial, sagittal, and coronal planes.  Data was post processed for extraction of 1.25 mm images at 10 mm increments for effective high-resolution CT imaging without additional radiation to the patient.    COMPARISON: Correlation is made to CT of the abdomen and pelvis 5/6/2017.      FINDINGS:    Examination of the vascular and soft tissue structures at the base of the neck is unremarkable.    The thoracic aorta maintains normal caliber, contour, and course without significant atherosclerotic calcification  within its course.  The heart is not enlarged and there is no evidence of pericardial effusion.The esophagus maintains a normal course and caliber. There are normal-sized axillary lymph nodes present.  No mediastinal adenopathy.  Hilar contours are within normal limits on the basis of this noncontrast exam.      The trachea is midline, the proximal airways are patent and the lungs are symmetrically expanded.   Examination of the lung fields innumerable pulmonary micronodules/nodules, the largest of which measures 0.6 cm in the superior segment of the left lower lobe (axial series 1, image 34). Per Fleischner Society 2017 guidelines, in a low or high risk patient: CT chest 3-6 months with followup CT chest in 18-24 months. There is no evidence of pleural fluid, focal consolidation or pneumothorax.  There are centrilobular emphysematous changes the pulmonary parenchyma with apical predominance and biapical scarring.      Incidentally visualized structures of the upper abdomen demonstrate no acute abnormality.  Please note that the patient's known liver lesion is not well delineated due to noncontrast technique.  There are postoperative changes of cholecystectomy.  There is high density material within the visualized portions of the renal collecting system, presumably relating to renal excretion of previously administered contrast bolus for prior CT examination.    Visualized osseous structures demonstrate no acute abnormality.            CT Head Without Contrast (Final result) Result time:  05/07/17 02:10:05    Final result by Arnav Peterson MD (05/07/17 02:10:05)    Impression:        No acute intracranial abnormalities.            Electronically signed by: ARNAV PETERSON MD  Date:     05/07/17  Time:    02:10     Narrative:    Comparison: 1/25/2017    Clinical history: Confusion disorientation    Technique:    Axial images of the brain were obtained at 5-mm intervals from the skull base to the vertex without the  administration of contrast.    Findings:    There is generalized cerebral volume loss.  There is hypoattenuation in a periventricular fashion, likely sequela of chronic microvascular ischemic change.  There is no evidence of acute major vascular territory infarct, hemorrhage, or mass.  There is no hydrocephalus.  There are no abnormal extra-axial fluid collections.  The paranasal sinuses and mastoid air cells are clear, and there is no evidence of calvarial fracture.  The visualized soft tissues are unremarkable.            CT Abdomen Pelvis With Contrast (Final result) Result time:  05/06/17 20:36:28    Final result by Arnav Peterson MD (05/06/17 20:36:28)    Impression:      1.  Hypoattenuating lesion within the right hepatic lobe, with adjacent nodular appearing low attenuation, findings overall are highly concerning for malignancy with metastatic disease locally, correlation with any history the same is advised.  Additionally, there is partial thrombosis of the right portal vein as it courses anterior to the rounded mass, tumor thrombus is not excluded.  Further evaluation and correlation with history is advised.    2.  Pulmonary micronodules within the right upper lobe and lower lobe, technically fall below established criteria for followup, however given the hepatic findings, followup is recommended at one year interval versus comparison with any previous exams.    3.  Multiple several additional findings above in this motion limited exam.      Electronically signed by: ARNAV PETERSON MD  Date:     05/06/17  Time:    20:36     Narrative:    CT abdomen and pelvis with contrast    Clinical history: Abdominal pain    Comparison: None    Technique:  Axial images of the abdomen and pelvis were obtained at 5 mm intervals following administration of 75 cc Omni 350 IV contrast.  Coronal and sagittal delayed images were reviewed.    Findings:  Images of the lower thorax are remarkable for a pulmonary nodule along  the fissure on the right measuring up to 0.3 cm.  Additional several scattered 1-2 mm pulmonary nodules are noted within the right lower lobe, nonspecific.  There is minimal bilateral basilar atelectasis.    The liver is enlarged.  There is diffuse hepatic hypoattenuation suggesting steatosis.  There is a low attenuating lesion within the right hepatic lobe medially measuring up to 3.7 cm.  Adjacent to this, there is heterogeneous abnormal low attenuation, with a similar appearance along the anterior right hepatic lobe margin.  Findings are concerning for infiltrative process, malignancy is suspected, correlation with any previous history of the same is advised.  The gallbladder is surgically absent.  The spleen heterogeneously enhances although somewhat normalizes on delayed imaging, suggesting perfusion abnormality.  Motion artifact limits evaluation of the abdomen overall.  Allowing for this, the pancreas and adrenal glands are grossly unremarkable.  There is abnormal narrowing of the right portal vein, with an intraluminal filling defect in the region of abutment of the above-mentioned hypoattenuating lesion concerning for thrombus.  The remainder of the portal vein appears patent as does the splenic vein, SMV, celiac axis and SMA.  Scattered shotty to mildly prominent periaortic, paracaval, and gastrohepatic lymph nodes are noted.    The kidneys enhance symmetrically and excrete contrast appropriately on delayed imaging without hydronephrosis or nephrolithiasis.  The bilateral ureters are difficult to follow in their entirety to the urinary bladder however no definite calculi along their visualized courses noting marked motion artifact.  The urinary bladder is grossly unremarkable.  The prostate is enlarged.    There are a few scattered colonic diverticula without definite inflammation however there is overall poor evaluation of the colon.  Terminal ileum is unremarkable.  The appendix is questionably  identified noting no pericecal inflammation.  The small bowel is grossly unremarkable.  No bulky pelvic lymphadenopathy.  There is atherosclerotic calcification of the aorta and its branches.    Degenerative changes are noted of the bilateral sacroiliac joints, and lower lumbar spine without convincing focal osseous destructive process allowing for motion artifact.  There are a few scattered sclerotic foci within the osseous structures, most likely bone islands given configuration.                Special Treatments/Procedures: none    Disposition:  Patient left AMA    Future Scheduled Appointments:  No future appointments.    Follow-up Plans from This Hospitalization:  None planned    Discharge Medication List:       Ac Andrade   Home Medication Instructions RONNIE:[id number]    Printed on:05/07/17 9497   Medication Information                      cetirizine (ZYRTEC) 10 MG tablet  Take 1 tablet (10 mg total) by mouth once daily.             lisinopril (PRINIVIL,ZESTRIL) 20 MG tablet  Take 20 mg by mouth once daily.             oxycodone-acetaminophen (PERCOCET)  mg per tablet  Take 1 tablet by mouth every 6 (six) hours as needed for Pain.                 Patient Instructions:    Discharge Procedure Orders  Diet general     Activity as tolerated         At the time of discharge patient was told to take all medications as prescribed, to keep all followup appointments, and to call their primary care physician or return to the emergency room if they have any worsening or concerning symptoms.    Luisito Ji DO  PGY1 Internal Medicine  Pager: [phone]

## 2017-05-07 NOTE — TELEPHONE ENCOUNTER
Patient: Ac Andrade       MRN: [id number]      : 1950     Age: 67 y.o.  [address]  [facility] [address]    Provider: Hepatologist - Sánchez    Patient Transplant Status: Other needs diagnosis of liver mass    Reason for presentation: Indeterminate lesion    Clinical Summary: Inpatient on IM service.  67-year-old man with h/o HTN, active smoker (1 PPD X 50 years ), chronic HCV (diagnosed in his 40s), presented to ED 17 with lower abdominal pain radiating to his back for one week.      The patient reports he has been having this pain intermittently for last six months but became constant and more severe over last one week. Also has loss of appetite and 30 lb weight loss over last six months, fatigue, generalized weakness, daughter noticed patient having intermittent confusion with disorientation. CT done to evaluate wt loss showed indeterminate liver mass.      Question: does he need biopsy for further evaluation of the hepatic mass?     Imaging to be reviewed: CT abdomen, chest 17    HCC Treatment History: treatment naive    ABO: O POS    Platelets:   Lab Results   Component Value Date/Time     2017 04:22 AM     Creatinine:   Lab Results   Component Value Date/Time    CREATININE 1.0 2017 04:22 AM     Bilirubin:   Lab Results   Component Value Date/Time    BILITOT 1.2 (H) 2017 04:22 AM     AFP Last 3 each if available:   Lab Results   Component Value Date/Time     (H) 2017 04:22 AM       MELD: MELD-Na score: 12 at 2017  4:23 AM  MELD score: 12 at 2017  4:23 AM  Calculated from:  Serum Creatinine: 1.0 mg/dL at 2017  4:22 AM  Serum Sodium: 140 mmol/L (Rounded to 137) at 2017  4:22 AM  Total Bilirubin: 1.2 mg/dL at 2017  4:22 AM  INR(ratio): 1.6 at 2017  4:23 AM  Age: 67 years    Plan:     Follow-up Provider: Dr Pozo after discharge, Dr. Keri Solomon while pt on inpatient service.

## 2017-05-07 NOTE — PROGRESS NOTES
Called and informed that Mr. Andrade wants to leave the hospital. Extensive discussion (over 30) with Mr. Andrade. I was extremely clear that I do not want him to leave the hospital. I told him that he most likely has liver cancer with metastatic disease. I clearly told him that this is a life threatening disease and that leaving delays his diagnosis and prevents timely treatment. I made it clear that he has cancer in his lungs and that further spread can involve his brain. I also told him that he has evidence that his liver is not working well and this will worsen and can result in death. In addition, he has some evidence of obstruction of the biliary tree which has caused pancreatic issues. He repeated very clearly back to me that he understands that he has cancer and it is bad. He reiterated that he understands that I do not want him to leave and that delaying his treatment is life threatening. He understands that his family does not want him to leave. He understands that we will not be providing any transportation. He states that he has made his peace and wants to go home. He is not having hallucinations, denies suicidal or homicidal ideation, is not manic or psychotic. He is not gravely impaired. No evidence of intoxication. Has not received significant narcotics or other medication that would effect his decision making.  He understands that I am not discharging him--he is leaving against medical advice. He also acknowledges that his hospitalization may not be covered by insurance and that he has no follow up for his disease. He is aware, oriented and capable of making decisions relevant to his care. I called Dr. Puentes and confirmed that the patient had expressed understanding that he has cancer and this is life threatening. Dr. Puentes agreed that patient is able to make decisions and understood why he was in hospital. Daughter and Dr. Pantoja were in room for full conversation. Daughter mentions in hallway that he  has a history of schizophrenia. We called psychiatry to assess patient but Mr. Andrade refused to talk and left hospital. While he was leaving I again tried to convince him to stay. I clearly told him that I was not discharging him. He again refused to stay. He stated that he wants to go home and sleep in his own bed.  Daughter informed that patient has left.     Julisa Pickens

## 2017-05-07 NOTE — CONSULTS
[facility]  Hematology/Oncology  Consult Note    Patient Name: Ac Andrade  MRN: [id number]  Admission Date: 5/6/2017  Hospital Length of Stay: 1 days  Code Status: Full Code   Attending Provider: Julisa Pickens MD  Consulting Provider: Negra Puentes MD  Primary Care Physician: Khadar Albarado MD  Principal Problem:Pancreatitis, acute    Inpatient consult to Hematology/Oncology  Consult performed by: NEGRA PUENTES  Consult ordered by: JIM REDD        Subjective:     HPI: 67-year-old man with h/o HTN, active smoker (1 PPD X 50 years ), recent diagnosis of hepatitis C presented to ED yesterday accompanied by his two daughters with lower abdominal pain radiating to his back for one week. Reports he has been having this pain intermittently for last six months but became constant and more severe over last one week. Also notes loss of appetite and 30 lb weight loss over last six months. He lives alone, able to perform ADLs and his two daughters who live in [place] keep close contact with their father. Daughters came in town yesterday to visit him and they were surprised about his rapid weight loss. They brought him to [facility] ED for further evaluation. Also reports fatigue, generalized weakness, daughter noticed patient having intermittent confusion with disorientation. Patient denies N/V/D, BRBPR, melena,cough, SOB. Daughter mentioned that patient was diagnosed with HCV about 30 years ago without any issue and was told he got it likely from tattoos . He follows periodically at [facility] with Dr. Mcdermott / Dr. Elizalde ( [phone] ). Per daughters during recent visit at [facility] patient was told that his hepatitis C was getting worse and CT abdomen showed liver abnormality (no reports available). Patient had negative colonoscopy in 2014. He smokes 1 PPD X 50 years, quits drinking beer about six months ago. Endorses smoking marijuana in remote past, but denies  h/o IVDU. Labs in ED significant for Hgb 9 down from 13 one year ago. Lipase 435, bilirubin 1.3, Alk -P 449, ALT 59, , calcium 11.6, albumin 2.9 with corrected calcium 12.4. 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 Medical Oncology consulted for suspected malignancy.       Oncology Treatment Plan:   [No treatment plan]    Medications:  Continuous Infusions:   sodium chloride 0.9% 125 mL/hr at 05/07/17 0042     Scheduled Meds:   enoxaparin  40 mg Subcutaneous Daily    lisinopril  20 mg Oral Daily    megestrol  200 mg Oral Daily    nicotine  1 patch Transdermal ED 1 Time    nicotine  1 patch Transdermal ED 1 Time    polyethylene glycol  17 g Oral Daily     PRN Meds:acetaminophen, albuterol-ipratropium 2.5mg-0.5mg/3mL, dextrose 50%, dextrose 50%, glucagon (human recombinant), glucose, glucose, lactulose, morphine, ondansetron, oxycodone, ramelteon     Review of patient's allergies indicates:  No Known Allergies     Past Medical History:   Diagnosis Date    Arthritis     Diabetes mellitus     Enlarged prostate     Hypertension      Past Surgical History:   Procedure Laterality Date    BUNIONECTOMY      CHOLECYSTECTOMY      EYE SURGERY       Family History     None        Social History Main Topics    Smoking status: Former Smoker     Quit date: 11/18/2015    Smokeless tobacco: Not on file    Alcohol use No    Drug use: No    Sexual activity: Not on file       Review of Systems   Constitutional: Positive for activity change, Loss of appetite, fatigue and unexpected weight change. Negative for chills, diaphoresis and fever.   Respiratory: Negative for apnea, cough, choking, chest tightness, shortness of breath, wheezing and stridor.   Cardiovascular: Negative for chest pain, palpitations and leg swelling.   Gastrointestinal: Positive for  abdominal pain. Negative for abdominal distention, anal bleeding, blood in stool, constipation, diarrhea, nausea, rectal pain and vomiting.   Endocrine: Negative for cold intolerance, heat intolerance, polydipsia, polyphagia and polyuria.   Genitourinary: Negative for decreased urine volume, difficulty urinating, discharge, dysuria, enuresis, flank pain, frequency, genital sores, hematuria, penile pain, penile swelling, scrotal swelling, testicular pain and urgency.   Musculoskeletal: Positive for back pain. Negative for arthralgias, gait problem, joint swelling, myalgias, neck pain and neck stiffness.   Skin: Negative for color change, pallor, rash and wound.   Allergic/Immunologic: Negative for environmental allergies, food allergies and immunocompromised state.   Neurological: Positive for weakness. Negative for dizziness, tremors, seizures, syncope, facial asymmetry, speech difficulty, light-headedness, numbness and headaches.   Hematological: Negative for adenopathy. Does not bruise/bleed easily.   Psychiatric/Behavioral: Positive for confusion.     Objective:     Vital Signs (Most Recent):  Temp: 97.4 °F (36.3 °C) (05/07/17 0426)  Pulse: 66 (05/07/17 0426)  Resp: 18 (05/07/17 0426)  BP: (!) 161/80 (05/07/17 0426)  SpO2: 98 % (05/07/17 0426) Vital Signs (24h Range):  Temp:  [97.4 °F (36.3 °C)-98.9 °F (37.2 °C)] 97.4 °F (36.3 °C)  Pulse:  [] 66  Resp:  [16-18] 18  SpO2:  [98 %-99 %] 98 %  BP: (138-168)/(75-84) 161/80     Weight: 50.8 kg (112 lb)  Body mass index is 17.54 kg/(m^2).  Body surface area is 1.55 meters squared.    No intake or output data in the 24 hours ending 05/07/17 0836    Physical Exam  Constitutional: No distress.   Thin, frail male appears older than stated age   HENT:   Head: Normocephalic and atraumatic.   Right Ear: External ear normal.   Left Ear: External ear normal.   Nose: Nose normal.   Mouth/Throat: Oropharynx is clear and moist. No oropharyngeal exudate.   Eyes: Conjunctivae  and EOM are normal. Pupils are equal, round, and reactive to light. Right eye exhibits no discharge. Left eye exhibits no discharge. No scleral icterus.   Neck: Normal range of motion. Neck supple. No JVD present. No tracheal deviation present. No thyromegaly present.   Cardiovascular: Normal rate, regular rhythm, normal heart sounds and intact distal pulses. Exam reveals no gallop and no friction rub.   No murmur heard.  Pulmonary/Chest: Effort normal and breath sounds normal. No stridor. No respiratory distress. He has no wheezes. He has no rales. He exhibits no tenderness.   Abdominal: Soft. Bowel sounds are normal. He exhibits no distension and no mass. There is tenderness.   Scapoid abdomen with enlarged, palpable liver    Musculoskeletal: Normal range of motion. He exhibits no edema, tenderness or deformity.   Lymphadenopathy:   He has no cervical adenopathy.   Neurological: He is alert. He has normal reflexes. He displays normal reflexes. No cranial nerve deficit. He exhibits normal muscle tone. Coordination normal. Oriented to person and time, but not to place. Intermittent confusion. Mild asterixis    Skin: Skin is warm and dry. No rash noted. He is not diaphoretic. No erythema. No pallor.   Psychiatric: He has a normal mood and affect. Poor insight      Significant Labs:   CBC:   Recent Labs  Lab 05/06/17 1740 05/07/17  0422   WBC 7.97 5.92   HGB 9.0* 8.9*   HCT 27.3* 25.9*    294   , CMP:   Recent Labs  Lab 05/06/17 1740 05/07/17  0422    140   K 3.6 3.6   * 110   CO2 20* 21*   * 188*   BUN 37* 31*   CREATININE 1.1 1.0   CALCIUM 11.6* 11.7*   PROT 8.1 8.1   ALBUMIN 2.9* 3.0*   BILITOT 1.3* 1.2*   ALKPHOS 449* 476*   * 325*   ALT 59* 61*   ANIONGAP 9 9   EGFRNONAA >60.0 >60.0    and Coagulation:   Recent Labs  Lab 05/07/17 0423   INR 1.6*       Diagnostic Results:  I have reviewed all pertinent imaging results/findings within the past 24 hours.    CT ABDOMEN PELVIS W  CONTRAST :      1.  Hypoattenuating lesion within the right hepatic lobe, with adjacent nodular appearing low attenuation, findings overall are highly concerning for malignancy with metastatic disease locally, correlation with any history the same is advised.  Additionally, there is partial thrombosis of the right portal vein as it courses anterior to the rounded mass, tumor thrombus is not excluded.  Further evaluation and correlation with history is advised.    2.  Pulmonary micronodules within the right upper lobe and lower lobe, technically fall below established criteria for followup, however given the hepatic findings, followup is recommended at one year interval versus comparison with any previous exams.    3.  Multiple several additional findings above in this motion limited exam.    Assessment/Plan:     Active Diagnoses:    Diagnosis Date Noted POA    PRINCIPAL PROBLEM:  Pancreatitis, acute [K85.90] 05/06/2017 Yes    Liver lesion, right lobe [K76.89] 05/06/2017 Yes    Portal vein thrombosis [I81] 05/06/2017 Yes    Hypercalcemia [E83.52] 05/06/2017 Yes    Moderate protein-calorie malnutrition [E44.0] 05/06/2017 Yes    Tobacco abuse [Z72.0] 05/06/2017 Yes    Chronic low back pain [M54.5, G89.29] 05/06/2017 Yes    Lower abdominal pain [R10.30] 05/06/2017 Yes    Weight loss, unintentional [R63.4] 05/06/2017 Yes    Lack of appetite [R63.0] 05/06/2017 Yes    Confusion and disorientation [F99] 05/06/2017 Yes    Multiple lung nodules on CT [R91.8] 05/06/2017 Yes    Essential hypertension [I10] 05/06/2017 Yes    Occult GI bleeding [R19.5] 05/06/2017 Yes    Anemia [D64.9] 05/06/2017 Yes      Problems Resolved During this Admission:    Diagnosis Date Noted Date Resolved POA     Hepatic Lesion  - given clinical presentation of weight loss and cachexia/anorexia in the background of HCV infection, suspect primary hepatobiliary malignancy. Although metastasis can't be ruled out. However, patient denies any  hematochezia, melena, or hemoptysis. Tumor markers unremarkable.   - AFP: 103 ng/mL  - recommend obtaining Triple Phase CT  - f/u Hepatitis Panel and Hep C RNA  - if not definitive HCC on imaging, then will need biopsy of the lesion via IR.    - consider Hepatology Evaluation and review case in Tumor Board to discuss diagnostic and possible therapeutic management.   - unsure if lower back pain is related to DJD or bone metastasis, would consider bone scan to r/o metastasis when appropriate.  - Child Wright Class A and ECO    - will need nutrition evaluation, PT/OT.   - if HCC confirmed and patient is not a candidate for any surgical or locoregional therapies, will re-evaluate patient for systemic therapies or clinical trials.     Hypercalcemia  - corrected Calcium 12.5 today, likely related to malignancy   - will administer Aredia 60 mg today     Normocytic Anemia  - likely anemia of chronic disease with HCV and Malignancy  - no signs of hemolysis   - nutritional work-up suggestive of low folic acid  - recommend folic acid 1 mg QD   - will monitor closely for now, will consider further work-up if deemed necessary.        Thank you for your consult. I will follow-up with patient. Please contact us if you have any additional questions.    Maxim Puentes MD  Hematology/Oncology  [facility]

## 2017-05-07 NOTE — H&P
[facility] Hospital Medicine  History & Physical    Patient Name: Ac Andrade  MRN: [id number]  Admission Date: 5/6/2017  Attending Physician: Julisa Pickens MD   Primary Care Provider: Khadar Albarado MD    Hospital Medicine Team: [facility]OSP MED L Vashti Ivey DO     Patient information was obtained from patient, two daughters at bedside  and ER records.     Subjective:     Principal Problem:Pancreatitis, acute    Chief Complaint:   Chief Complaint   Patient presents with    Abdominal Pain     loss of appetite/ family states lethargic, not eating, recently diagnosed with Hep C    Hepatitis C        HPI: 67-year-old man with h/o HTN, chronic low back pian on percocet prn, active smoker ( 1 PPD X 50 years ), recent diagnosis of hepatitis C presents to ED accompanied by his two daughters with lower abdominal pain radiating to his back for one week. Reports he has been having this pain intermittently for last six months but became constant and more severe over last one week. Describes the pain as sharp in nature, worsen with food intake and not relieved with percocet 10/325mg that he takes prn for his chronic back pain. Also notes loss of appetite and 30 lb weight loss over last six months. He lives alone, able to perform ADLs and his two daughters who live in [place] keep close contact with their father. Daughters came in town yesterday to visit him and they were surprised about his rapid weight loss. They brought him to [facility] ED for further evaluation.   Also reports fatigue, generalized weakness, daughter noticed patient having intermittent confusion with disorientation. Patient denies N/V/D, BRBPR, melena,cough, SOB, chest pain, hemoptysis, hematuria, or any other urinary symptoms.     Daughter mentioned that patient was diagnosed with HCV about 30 years ago without any issue  and was told he got it likely from tattoos . He follows periodically at [facility]  with Dr. Mcdermott / Dr. Elizalde ( [phone] ). Per daughters during recent visit at [facility] patient was told that his hepatitis C was getting worse and CT abdomen showed liver abnormality ( no reports available ). Patient had negative colonoscopy in 2014.     He was on lantus and metformin in past for DM, but was recently discontinued since he lost significant weight and blood glucose remained within acceptable range w/o medications.     Currently he only takes lisinopril 20 mg daily and percocet 10/325 prn back pain.     He smokes 1 PPD X 50 years, quits drinking beer about six months ago. Endorses smoking marijuana in remote past, but denies h/o IVDU.       Labs in ED significant for Hgb 9 down from 13 one year ago. Lipase 435, bilirubin 1.3, Alk -P 449, ALT 59, , calcium 11.6, albumin 2.9 with corrected calcium 12.4.     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     He received morphine 4 mg IVP X 2 for abdominal pain, zofran for nausea and NS 1000 CC bolus in ED.        Past Medical History:   Diagnosis Date    Arthritis     Diabetes mellitus     Enlarged prostate     Hypertension        Past Surgical History:   Procedure Laterality Date    BUNIONECTOMY      CHOLECYSTECTOMY      EYE SURGERY         Review of patient's allergies indicates:  No Known Allergies    No current facility-administered medications on file prior to encounter.      Current Outpatient Prescriptions on File Prior to Encounter   Medication Sig    cetirizine (ZYRTEC) 10 MG tablet Take 1 tablet (10 mg total) by mouth once daily.     Family History     None        Social History Main Topics    Smoking status: Former Smoker     Quit date: 11/18/2015    Smokeless tobacco: Not on file    Alcohol use No    Drug use: No    Sexual activity: Not on file     Review of Systems    Constitutional: Positive for activity change, appetite change (Loss of appetite ), fatigue and unexpected weight change (30 lbs weight loss over last six months ). Negative for chills, diaphoresis and fever.   HENT: Negative for congestion, dental problem, drooling, ear discharge, ear pain, facial swelling, hearing loss, mouth sores, nosebleeds, postnasal drip, rhinorrhea, sinus pressure, sneezing, sore throat, tinnitus, trouble swallowing and voice change.    Eyes: Negative for photophobia, pain, discharge, redness, itching and visual disturbance.   Respiratory: Negative for apnea, cough, choking, chest tightness, shortness of breath, wheezing and stridor.    Cardiovascular: Negative for chest pain, palpitations and leg swelling.   Gastrointestinal: Positive for abdominal pain (Lower abdominal pain radiating to back ). Negative for abdominal distention, anal bleeding, blood in stool, constipation, diarrhea, nausea, rectal pain and vomiting.   Endocrine: Negative for cold intolerance, heat intolerance, polydipsia, polyphagia and polyuria.   Genitourinary: Negative for decreased urine volume, difficulty urinating, discharge, dysuria, enuresis, flank pain, frequency, genital sores, hematuria, penile pain, penile swelling, scrotal swelling, testicular pain and urgency.   Musculoskeletal: Positive for back pain (Chronic low back pain ). Negative for arthralgias, gait problem, joint swelling, myalgias, neck pain and neck stiffness.   Skin: Negative for color change, pallor, rash and wound.   Allergic/Immunologic: Negative for environmental allergies, food allergies and immunocompromised state.   Neurological: Positive for weakness (Generalized weakness ). Negative for dizziness, tremors, seizures, syncope, facial asymmetry, speech difficulty, light-headedness, numbness and headaches.   Hematological: Negative for adenopathy. Does not bruise/bleed easily.   Psychiatric/Behavioral: Positive for confusion (Intermittent  confusion ) and dysphoric mood. Negative for agitation, behavioral problems, decreased concentration, hallucinations, self-injury, sleep disturbance and suicidal ideas. The patient is not nervous/anxious and is not hyperactive.      Objective:     Vital Signs (Most Recent):  Temp: 98 °F (36.7 °C) (05/06/17 2300)  Pulse: 82 (05/06/17 2300)  Resp: 18 (05/06/17 2300)  BP: (!) 140/84 (05/06/17 2300)  SpO2: 98 % (05/06/17 2300) Vital Signs (24h Range):  Temp:  [97.5 °F (36.4 °C)-98.9 °F (37.2 °C)] 98 °F (36.7 °C)  Pulse:  [] 82  Resp:  [16-18] 18  SpO2:  [98 %-99 %] 98 %  BP: (138-168)/(75-84) 140/84     Weight: 50.8 kg (112 lb)  Body mass index is 17.54 kg/(m^2).    Physical Exam   Constitutional: No distress.   Thin, frail male appears older than stated age   HENT:   Head: Normocephalic and atraumatic.   Right Ear: External ear normal.   Left Ear: External ear normal.   Nose: Nose normal.   Mouth/Throat: Oropharynx is clear and moist. No oropharyngeal exudate.   Eyes: Conjunctivae and EOM are normal. Pupils are equal, round, and reactive to light. Right eye exhibits no discharge. Left eye exhibits no discharge. No scleral icterus.   Neck: Normal range of motion. Neck supple. No JVD present. No tracheal deviation present. No thyromegaly present.   Cardiovascular: Normal rate, regular rhythm, normal heart sounds and intact distal pulses.  Exam reveals no gallop and no friction rub.    No murmur heard.  Pulmonary/Chest: Effort normal and breath sounds normal. No stridor. No respiratory distress. He has no wheezes. He has no rales. He exhibits no tenderness.   Abdominal: Soft. Bowel sounds are normal. He exhibits no distension and no mass. There is tenderness (Moderate tenderness across mid abdomen ). No hernia.   Scapoid abdomen with enlarged, palpable liver    Musculoskeletal: Normal range of motion. He exhibits no edema, tenderness or deformity.   Lymphadenopathy:     He has no cervical adenopathy.   Neurological:  He is alert. He has normal reflexes. He displays normal reflexes. No cranial nerve deficit. He exhibits normal muscle tone. Coordination normal.   Oriented to person and time, but not to place.  Intermittent confusion.   Mild asterixis    Skin: Skin is warm and dry. No rash noted. He is not diaphoretic. No erythema. No pallor.   Psychiatric: He has a normal mood and affect. His behavior is normal. Judgment and thought content normal.   Poor insight        Significant Labs:   Recent Results (from the past 24 hour(s))   CBC W/ AUTO DIFFERENTIAL    Collection Time: 05/06/17  5:40 PM   Result Value Ref Range    WBC 7.97 3.90 - 12.70 K/uL    RBC 3.33 (L) 4.60 - 6.20 M/uL    Hemoglobin 9.0 (L) 14.0 - 18.0 g/dL    Hematocrit 27.3 (L) 40.0 - 54.0 %    MCV 82 82 - 98 fL    MCH 27.0 27.0 - 31.0 pg    MCHC 33.0 32.0 - 36.0 %    RDW 15.8 (H) 11.5 - 14.5 %    Platelets 308 150 - 350 K/uL    MPV 10.3 9.2 - 12.9 fL    Gran # 4.0 1.8 - 7.7 K/uL    Lymph # 2.4 1.0 - 4.8 K/uL    Mono # 1.3 (H) 0.3 - 1.0 K/uL    Eos # 0.2 0.0 - 0.5 K/uL    Baso # 0.02 0.00 - 0.20 K/uL    Gran% 50.2 38.0 - 73.0 %    Lymph% 30.2 18.0 - 48.0 %    Mono% 16.4 (H) 4.0 - 15.0 %    Eosinophil% 2.8 0.0 - 8.0 %    Basophil% 0.3 0.0 - 1.9 %    Differential Method Automated    Comp. Metabolic Panel    Collection Time: 05/06/17  5:40 PM   Result Value Ref Range    Sodium 140 136 - 145 mmol/L    Potassium 3.6 3.5 - 5.1 mmol/L    Chloride 111 (H) 95 - 110 mmol/L    CO2 20 (L) 23 - 29 mmol/L    Glucose 163 (H) 70 - 110 mg/dL    BUN, Bld 37 (H) 8 - 23 mg/dL    Creatinine 1.1 0.5 - 1.4 mg/dL    Calcium 11.6 (H) 8.7 - 10.5 mg/dL    Total Protein 8.1 6.0 - 8.4 g/dL    Albumin 2.9 (L) 3.5 - 5.2 g/dL    Total Bilirubin 1.3 (H) 0.1 - 1.0 mg/dL    Alkaline Phosphatase 449 (H) 55 - 135 U/L     (H) 10 - 40 U/L    ALT 59 (H) 10 - 44 U/L    Anion Gap 9 8 - 16 mmol/L    eGFR if African American >60.0 >60 mL/min/1.73 m^2    eGFR if non African American >60.0 >60 mL/min/1.73  m^2   Lipase    Collection Time: 05/06/17  5:40 PM   Result Value Ref Range    Lipase 435 (H) 4 - 60 U/L         Significant Imaging: I have reviewed and interpreted all pertinent imaging results/findings within the past 24 hours.    CT ABDOMEN PELVIS W CONTRAST :     1.  Hypoattenuating lesion within the right hepatic lobe, with adjacent nodular appearing low attenuation, findings overall are highly concerning for malignancy with metastatic disease locally, correlation with any history the same is advised.  Additionally, there is partial thrombosis of the right portal vein as it courses anterior to the rounded mass, tumor thrombus is not excluded.  Further evaluation and correlation with history is advised.    2.  Pulmonary micronodules within the right upper lobe and lower lobe, technically fall below established criteria for followup, however given the hepatic findings, followup is recommended at one year interval versus comparison with any previous exams.    3.  Multiple several additional findings above in this motion limited exam.        Assessment/Plan:     Active Diagnoses:    Diagnosis Date Noted POA    PRINCIPAL PROBLEM:  Pancreatitis, acute [K85.90] 05/06/2017 Yes    Liver lesion, right lobe [K76.89] 05/06/2017 Yes    Portal vein thrombosis [I81] 05/06/2017 Yes    Hypercalcemia [E83.52] 05/06/2017 Yes    Moderate protein-calorie malnutrition [E44.0] 05/06/2017 Yes    Tobacco abuse [Z72.0] 05/06/2017 Yes    Chronic low back pain [M54.5, G89.29] 05/06/2017 Yes    Lower abdominal pain [R10.30] 05/06/2017 Yes    Weight loss, unintentional [R63.4] 05/06/2017 Yes    Lack of appetite [R63.0] 05/06/2017 Yes    Confusion and disorientation [F99] 05/06/2017 Yes    Multiple lung nodules on CT [R91.8] 05/06/2017 Yes    Essential hypertension [I10] 05/06/2017 Yes    Occult GI bleeding [R19.5] 05/06/2017 Yes    Anemia [D64.9] 05/06/2017 Yes      Problems Resolved During this Admission:    Diagnosis Date  Noted Date Resolved POA     VTE Risk Mitigation         Ordered     enoxaparin injection 40 mg  Daily     Route:  Subcutaneous        05/06/17 2119     Medium Risk of VTE  Once      05/06/17 2119        # Hepatic Lesion   - CT abdomen showed 3.7 cm right hepatic lesion with tumor thrombus of right portal vain with partial occlusion   - Given h/o HCV, weight loss, lack of appetite the above finding highly suspicious for hepatocellular carcinoma   - No ascites on exam or CT   - Check CT head given intermittent confusion and liver lesion to r/o mets   - Check CT chest to r/o mets ( micro nodules on abdominal CT )  - Check hepatitis panel, HCV PCR, AFP, CA 19-9, CEA, ammonia, GGT,  HIV, RPR   - Hepatology and oncology consult for further evaluation of suspected HCC   - pain control with prn morphine     # Anemia with Occult GI bleed   - Hgb dropped to 9 compared to 13 about one year ago   - patient denies adrien bleeding or melena   - Guaiac positive stool in ED   - No evidence of active bleeding   - Had negative colonoscopy in 2014 per daughters and patient   - Never had EGD   - Platelet wnl   - check PT/INR in am   - Check iron studies, FOBT, ferritin, vit B12, folate, retic count, haptoglobin for anemia   - If Hgb continues to trend downward then consider GI consult with EGD to evaluate for varices     # Pancreatitis    - Lipase elevated to 435 with pain across lower abdomen radiating to back   - CT negative for acute pancreatitis   - Supportive care with IVF, pain control prn     # Hypercalcemia    - Corrected Calcium 12.4   - Likely paraneoplastic etiology   - check EKG, PTH, PTHrp, ionized Ca, vitamin D level   -  Hypercalcemia might be contributing to intermittent confusion, disorientation and abdominal pain   - Continue IVF and then consider lasix     # Loss of appetite with weight loss and protein calorie malnutrition    - Likely from malignancy ( HCC )  - Trial of megace and boost plus with meal   - check pre  albumin level     # HTN    - Continue home dose lisinopril     # Chronic low back pain     - due to lumbar DJD     - Takes percocet 10/325 prn at home     - Oxycodone and morphine IVP prn while in hospital     # Tobacco abuse    - Counseled cessation    - nicotine patch     # DVT PPX   - Lovenox 40 mg daily     Code Status : Full       Vashti Ivey DO  Department of Hospital Medicine   [facility]

## 2017-05-08 LAB
HAV IGM SERPL QL IA: NEGATIVE
HBV CORE IGM SERPL QL IA: NEGATIVE
HBV SURFACE AG SERPL QL IA: NEGATIVE
HCV AB SERPL QL IA: POSITIVE
HIV 1+2 AB+HIV1 P24 AG SERPL QL IA: NEGATIVE
RPR SER QL: NORMAL

## 2017-05-08 NOTE — NURSING
66 y/o male here with pancreatitis.  Has recently lost over 30 lbs in 3 months.  His daughter arrived in later part of morning.  Patient became irrate with the issue of staying in the hospital and not happy with the morphine given.  Requested more medication, offered oxycodone IR and refused along with am meds.    Threatened to go AMA;  MD contacted and visited patient.  Patient had disconnected his IV and threated to pull his hep lock out, Was signed out AMA by his primary and Psychiatrist was called to speak to Mr. Andrade.  Patient left before he could be counseled.  His daughter reported a previous history of paranoid schizophrenia.    No signs of paranoia were observed.    Discharged AMA

## 2017-05-09 LAB
HCV LOG: 6.02 LOG (10) IU/ML
HCV RNA QUANT PCR: ABNORMAL IU/ML
HCV, QUALITATIVE: DETECTED IU/ML

## 2017-05-10 LAB — PTH RELATED PROT SERPL-SCNC: 0.8 PMOL/L
